# Patient Record
Sex: FEMALE | URBAN - METROPOLITAN AREA
[De-identification: names, ages, dates, MRNs, and addresses within clinical notes are randomized per-mention and may not be internally consistent; named-entity substitution may affect disease eponyms.]

---

## 2023-05-16 ENCOUNTER — APPOINTMENT (RX ONLY)
Dept: URBAN - METROPOLITAN AREA CLINIC 87 | Facility: CLINIC | Age: 68
Setting detail: DERMATOLOGY
End: 2023-05-16

## 2023-05-16 DIAGNOSIS — L20.89 OTHER ATOPIC DERMATITIS: ICD-10-CM

## 2023-05-16 PROBLEM — L20.84 INTRINSIC (ALLERGIC) ECZEMA: Status: ACTIVE | Noted: 2023-05-16

## 2023-05-16 PROCEDURE — ? PRESCRIPTION

## 2023-05-16 PROCEDURE — ? PRESCRIPTION MEDICATION MANAGEMENT

## 2023-05-16 PROCEDURE — 99204 OFFICE O/P NEW MOD 45 MIN: CPT

## 2023-05-16 PROCEDURE — ? COUNSELING

## 2023-05-16 RX ORDER — DESONIDE 0.5 MG/G
CREAM TOPICAL BID
Qty: 60 | Refills: 1 | Status: ERX | COMMUNITY
Start: 2023-05-16

## 2023-05-16 RX ADMIN — DESONIDE: 0.5 CREAM TOPICAL at 00:00

## 2023-05-16 ASSESSMENT — LOCATION DETAILED DESCRIPTION DERM
LOCATION DETAILED: LEFT SUPERIOR ANTERIOR NECK
LOCATION DETAILED: RIGHT SUPERIOR MEDIAL MALAR CHEEK

## 2023-05-16 ASSESSMENT — LOCATION ZONE DERM
LOCATION ZONE: NECK
LOCATION ZONE: FACE

## 2023-05-16 ASSESSMENT — LOCATION SIMPLE DESCRIPTION DERM
LOCATION SIMPLE: LEFT ANTERIOR NECK
LOCATION SIMPLE: RIGHT CHEEK

## 2023-05-16 NOTE — PROCEDURE: PRESCRIPTION MEDICATION MANAGEMENT
Render In Strict Bullet Format?: No
Plan: Recommended aquaphor healing ointment, Cerave moisturizing cream to moisturize skin to prevent rash
Detail Level: Zone
Initiate Treatment: desonide 0.05 % topical cream BID\\nApply twice daily to rash on neck and face up to 2 weeks/month as needed.

## 2023-05-16 NOTE — PROCEDURE: MIPS QUALITY
Quality 130: Documentation Of Current Medications In The Medical Record: Current Medications Documented
Detail Level: Detailed
Quality 431: Preventive Care And Screening: Unhealthy Alcohol Use - Screening: Patient not identified as an unhealthy alcohol user when screened for unhealthy alcohol use using a systematic screening method
Quality 110: Preventive Care And Screening: Influenza Immunization: Influenza Immunization Administered during Influenza season
Quality 111:Pneumonia Vaccination Status For Older Adults: Patient did not receive any pneumococcal conjugate or polysaccharide vaccine on or after their 60th birthday and before the end of the measurement period
Quality 226: Preventive Care And Screening: Tobacco Use: Screening And Cessation Intervention: Patient screened for tobacco use and is an ex/non-smoker

## 2023-05-29 ENCOUNTER — RX ONLY (OUTPATIENT)
Age: 68
Setting detail: RX ONLY
End: 2023-05-29

## 2023-05-29 RX ORDER — TRIAMCINOLONE ACETONIDE 0.25 MG/G
CREAM TOPICAL
Qty: 80 | Refills: 1 | Status: CANCELLED | COMMUNITY
Start: 2023-05-29

## 2023-05-30 ENCOUNTER — RX ONLY (OUTPATIENT)
Age: 68
Setting detail: RX ONLY
End: 2023-05-30

## 2023-05-30 RX ORDER — DESONIDE 0.5 MG/G
CREAM TOPICAL BID
Qty: 60 | Refills: 1